# Patient Record
Sex: MALE | Race: WHITE | NOT HISPANIC OR LATINO | Employment: FULL TIME | URBAN - METROPOLITAN AREA
[De-identification: names, ages, dates, MRNs, and addresses within clinical notes are randomized per-mention and may not be internally consistent; named-entity substitution may affect disease eponyms.]

---

## 2021-07-06 ENCOUNTER — COMMUNICATION - HEALTHEAST (OUTPATIENT)
Dept: FAMILY MEDICINE | Facility: CLINIC | Age: 31
End: 2021-07-06

## 2022-07-21 ASSESSMENT — ENCOUNTER SYMPTOMS
ABDOMINAL PAIN: 0
NERVOUS/ANXIOUS: 0
FREQUENCY: 0
ARTHRALGIAS: 0
JOINT SWELLING: 0
CONSTIPATION: 0
HEADACHES: 0
NAUSEA: 0
HEMATURIA: 0
COUGH: 0
FEVER: 0
CHILLS: 0
HEMATOCHEZIA: 0
SORE THROAT: 0
MYALGIAS: 0
PARESTHESIAS: 0
DIARRHEA: 0
WEAKNESS: 0
SHORTNESS OF BREATH: 0
HEARTBURN: 0
PALPITATIONS: 0
DIZZINESS: 0
DYSURIA: 0
EYE PAIN: 0

## 2022-07-22 ENCOUNTER — OFFICE VISIT (OUTPATIENT)
Dept: FAMILY MEDICINE | Facility: CLINIC | Age: 32
End: 2022-07-22
Payer: COMMERCIAL

## 2022-07-22 VITALS
HEIGHT: 73 IN | HEART RATE: 57 BPM | BODY MASS INDEX: 24.65 KG/M2 | WEIGHT: 186 LBS | OXYGEN SATURATION: 98 % | RESPIRATION RATE: 12 BRPM | TEMPERATURE: 98.1 F | SYSTOLIC BLOOD PRESSURE: 102 MMHG | DIASTOLIC BLOOD PRESSURE: 64 MMHG

## 2022-07-22 DIAGNOSIS — Z00.00 ROUTINE GENERAL MEDICAL EXAMINATION AT A HEALTH CARE FACILITY: Primary | ICD-10-CM

## 2022-07-22 PROCEDURE — 99395 PREV VISIT EST AGE 18-39: CPT | Performed by: FAMILY MEDICINE

## 2022-07-22 ASSESSMENT — ENCOUNTER SYMPTOMS
SHORTNESS OF BREATH: 0
COUGH: 0
PARESTHESIAS: 0
DIARRHEA: 0
SORE THROAT: 0
ARTHRALGIAS: 0
HEADACHES: 0
ABDOMINAL PAIN: 0
DIZZINESS: 0
HEMATOCHEZIA: 0
CONSTIPATION: 0
FEVER: 0
MYALGIAS: 0
PALPITATIONS: 0
HEMATURIA: 0
FREQUENCY: 0
NERVOUS/ANXIOUS: 0
DYSURIA: 0
WEAKNESS: 0
JOINT SWELLING: 0
CHILLS: 0
HEARTBURN: 0
NAUSEA: 0
EYE PAIN: 0

## 2022-07-22 NOTE — PROGRESS NOTES
SUBJECTIVE:   CC: Mynor Richmond is an 31 year old male who presents for preventative health visit.       Healthy Habits:     Getting at least 3 servings of Calcium per day:  NO    Bi-annual eye exam:  Yes    Dental care twice a year:  NO    Sleep apnea or symptoms of sleep apnea:  None    Diet:  Regular (no restrictions)    Frequency of exercise:  1 day/week    Duration of exercise:  15-30 minutes    Taking medications regularly:  Yes    Medication side effects:  Not applicable and None    PHQ-2 Total Score: 0    Additional concerns today:  Yes    5 days ago had deer tick bite on left flank  Over 24 hours embedded  Slight redness surrounding area  No other large rash  No symptoms    Vertigo  Mid-May to Mid-- had persistent vertigo  Positional  Took benadryl and then spontaneously resolved after 1 month  No recurrence    R axillary region  Bump  Was larger, now much smaller  Not painful    Goes to chiropractor intermittently for low back pain    Today's PHQ-2 Score:   PHQ-2 (  [employer]) 2022   Q1: Little interest or pleasure in doing things 0   Q2: Feeling down, depressed or hopeless 0   PHQ-2 Score 0   Q1: Little interest or pleasure in doing things Not at all   Q2: Feeling down, depressed or hopeless Not at all   PHQ-2 Score 0       Abuse: Current or Past(Physical, Sexual or Emotional)- No  Do you feel safe in your environment? Yes    Have you ever done Advance Care Planning? (For example, a Health Directive, POLST, or a discussion with a medical provider or your loved ones about your wishes): No, advance care planning information given to patient to review.  Patient declined advance care planning discussion at this time.    Social History     Tobacco Use     Smoking status: Former Smoker     Packs/day: 0.25     Years: 10.00     Pack years: 2.50     Start date: 2007     Quit date: 2017     Years since quittin.5     Smokeless tobacco: Never Used   Substance Use Topics     Alcohol use: Yes        Alcohol Use 7/21/2022   Prescreen: >3 drinks/day or >7 drinks/week? Yes   AUDIT SCORE  9     AUDIT - Alcohol Use Disorders Identification Test - Reproduced from the World Health Organization Audit 2001 (Second Edition) 7/21/2022   1.  How often do you have a drink containing alcohol? 4 or more times a week   2.  How many drinks containing alcohol do you have on a typical day when you are drinking? 3 or 4   3.  How often do you have five or more drinks on one occasion? Weekly   4.  How often during the last year have you found that you were not able to stop drinking once you had started? Never   5.  How often during the last year have you failed to do what was normally expected of you because of drinking? Never   6.  How often during the last year have you needed a first drink in the morning to get yourself going after a heavy drinking session? Never   7.  How often during the last year have you had a feeling of guilt or remorse after drinking? Never   8.  How often during the last year have you been unable to remember what happened the night before because of your drinking? Less than monthly   9.  Have you or someone else been injured because of your drinking? No   10. Has a relative, friend, doctor or other health care worker been concerned about your drinking or suggested you cut down? No   TOTAL SCORE 9       Reviewed orders with patient. Reviewed health maintenance and updated orders accordingly - Yes    Reviewed and updated as needed this visit by clinical staff   Tobacco  Allergies  Meds                Reviewed and updated as needed this visit by Provider   Tobacco  Allergies  Meds  Problems  Med Hx  Surg Hx  Fam Hx               Review of Systems   Constitutional: Negative for chills and fever.   HENT: Negative for congestion, ear pain, hearing loss and sore throat.    Eyes: Negative for pain and visual disturbance.   Respiratory: Negative for cough and shortness of breath.    Cardiovascular:  "Negative for chest pain, palpitations and peripheral edema.   Gastrointestinal: Negative for abdominal pain, constipation, diarrhea, heartburn, hematochezia and nausea.   Genitourinary: Negative for dysuria, frequency, genital sores, hematuria, impotence, penile discharge and urgency.   Musculoskeletal: Negative for arthralgias, joint swelling and myalgias.   Skin: Negative for rash.   Neurological: Negative for dizziness, weakness, headaches and paresthesias.   Psychiatric/Behavioral: Negative for mood changes. The patient is not nervous/anxious.          OBJECTIVE:   /64 (BP Location: Left arm, Patient Position: Sitting, Cuff Size: Adult Regular)   Pulse 57   Temp 98.1  F (36.7  C) (Oral)   Resp 12   Ht 1.865 m (6' 1.43\")   Wt 84.4 kg (186 lb)   SpO2 98%   BMI 24.26 kg/m      Physical Exam  General: Alert, no distress  Eyes: PERRL, EOMI, sclera normal.  HENT: Normocephalic, no pharyngeal erythema, MMM.  Neck: Supple, no adenopathy.  Heart: Normal S1 and S2, regular rhythm. No murmurs, gallops, rubs.  Lungs: CTA bilaterally, no wheezes, no crackles, no rhonchi.  Abdomen: Soft, non-tender, non-distended  Neuro: No focal deficits noted.  Skin: Warm and well perfused, no rashes noted. Multiple scattered nevus. Mobile, circular subcutaneous nodule under R axillary region.  Psych: Normal mood and affect.      ASSESSMENT/PLAN:     Mynor was seen today for physical.    Diagnoses and all orders for this visit:    Routine general medical examination at a health care facility      Reviewed recommendations for aerobic exercise  Recommended to cut back on EtOH intake- no concerning features for EtOH use disorder    R axillary region- most consistent with lymph node. Non-tender. Mobile. Offered reassurance- consider CBC if persists.    Encouraged sunscreen- skin check today with multiple scattered benign appearing nevi.       COUNSELING:   Reviewed preventive health counseling, as reflected in patient " "instructions    Estimated body mass index is 24.26 kg/m  as calculated from the following:    Height as of this encounter: 1.865 m (6' 1.43\").    Weight as of this encounter: 84.4 kg (186 lb).         He reports that he quit smoking about 5 years ago. He started smoking about 15 years ago. He has a 2.50 pack-year smoking history. He has never used smokeless tobacco.      Counseling Resources:  ATP IV Guidelines  Pooled Cohorts Equation Calculator  FRAX Risk Assessment  ICSI Preventive Guidelines  Dietary Guidelines for Americans, 2010  USDA's MyPlate  ASA Prophylaxis  Lung CA Screening    Cece Gay MD  [facility]  "

## 2022-10-03 ENCOUNTER — HEALTH MAINTENANCE LETTER (OUTPATIENT)
Age: 32
End: 2022-10-03

## 2023-09-10 ENCOUNTER — HEALTH MAINTENANCE LETTER (OUTPATIENT)
Age: 33
End: 2023-09-10

## 2024-11-03 ENCOUNTER — HEALTH MAINTENANCE LETTER (OUTPATIENT)
Age: 34
End: 2024-11-03

## 2025-06-11 ENCOUNTER — OFFICE VISIT (OUTPATIENT)
Dept: URGENT CARE | Facility: URGENT CARE | Age: 35
End: 2025-06-11
Payer: COMMERCIAL

## 2025-06-11 VITALS
OXYGEN SATURATION: 97 % | WEIGHT: 204 LBS | RESPIRATION RATE: 16 BRPM | HEIGHT: 73 IN | TEMPERATURE: 97.2 F | SYSTOLIC BLOOD PRESSURE: 113 MMHG | BODY MASS INDEX: 27.04 KG/M2 | HEART RATE: 65 BPM | DIASTOLIC BLOOD PRESSURE: 74 MMHG

## 2025-06-11 DIAGNOSIS — H00.014 HORDEOLUM EXTERNUM OF LEFT UPPER EYELID: Primary | ICD-10-CM

## 2025-06-11 PROCEDURE — 99213 OFFICE O/P EST LOW 20 MIN: CPT

## 2025-06-11 PROCEDURE — 3078F DIAST BP <80 MM HG: CPT

## 2025-06-11 PROCEDURE — 3074F SYST BP LT 130 MM HG: CPT

## 2025-06-11 NOTE — PROGRESS NOTES
Urgent Care Clinic Visit    Chief Complaint   Patient presents with    Urgent Care    Swelling Around Left Eye     Pt reports swelling and sore for 1 week, noticed a lump on upper left eyelid  Pt reports this morning woke up crusty discharge in the left eye and redness in eye, placed warm compress and took Advil around 9:30 AM                6/11/2025    11:17 AM   Additional Questions   Roomed by Areli   Accompanied by self

## 2025-06-11 NOTE — PROGRESS NOTES
"Assessment & Plan     Hordeolum externum of left upper eyelid  Stye of left upper eyelid. Developed last night. No fever, chills, malaise, vision changes. VSS. No evidence of preseptal cellulitis on exam. Discussed supportive cares for stye with warm compresses for 10 minutes at least 4 times daily; info provided in AVS. Return precautions reviewed.        No follow-ups on file.    Maira Mccauley MD  [facility] IMANI Lowe is a 34 year old male who presents to clinic today for the following health issues:  Chief Complaint   Patient presents with    Urgent Care    Swelling Around Left Eye     Pt reports swelling and sore for 1 week, noticed a lump on upper left eyelid  Pt reports this morning woke up crusty discharge in the left eye and redness in eye, placed warm compress and took Advil around 9:30 AM          6/11/2025    11:17 AM   Additional Questions   Roomed by Areli   Accompanied by self     HPI    Developed left upper eyelid swelling last night. Woke up with worse swelling and a hard spot in his eyelid. No history of this. No fever, chills, vision changes, eye globe pain.       Review of Systems  Constitutional, HEENT, cardiovascular, pulmonary, gi and gu systems are negative, except as otherwise noted.      Objective    /74   Pulse 65   Temp 97.2  F (36.2  C) (Tympanic)   Resp 16   Ht 1.854 m (6' 1\")   Wt 92.5 kg (204 lb)   SpO2 97%   BMI 26.91 kg/m    Physical Exam   Eyes: PERRL, normal conjunctiva without erythema, EOMI, no pain with eye movement, left upper eyelid with area of swelling and erythema consistent with stye      "